# Patient Record
Sex: MALE | Race: WHITE | NOT HISPANIC OR LATINO | Employment: OTHER | URBAN - NONMETROPOLITAN AREA
[De-identification: names, ages, dates, MRNs, and addresses within clinical notes are randomized per-mention and may not be internally consistent; named-entity substitution may affect disease eponyms.]

---

## 2023-07-18 ENCOUNTER — OFFICE VISIT (OUTPATIENT)
Dept: PRIMARY CARE | Facility: CLINIC | Age: 63
End: 2023-07-18
Payer: COMMERCIAL

## 2023-07-18 VITALS
DIASTOLIC BLOOD PRESSURE: 76 MMHG | TEMPERATURE: 97.2 F | WEIGHT: 315 LBS | SYSTOLIC BLOOD PRESSURE: 122 MMHG | HEART RATE: 64 BPM | RESPIRATION RATE: 16 BRPM | BODY MASS INDEX: 50.05 KG/M2 | OXYGEN SATURATION: 93 %

## 2023-07-18 DIAGNOSIS — G25.0 BENIGN HEAD TREMOR: ICD-10-CM

## 2023-07-18 DIAGNOSIS — Z12.5 SCREENING PSA (PROSTATE SPECIFIC ANTIGEN): ICD-10-CM

## 2023-07-18 DIAGNOSIS — R56.9 SEIZURE (MULTI): ICD-10-CM

## 2023-07-18 DIAGNOSIS — Z00.00 HEALTHCARE MAINTENANCE: ICD-10-CM

## 2023-07-18 DIAGNOSIS — Z23 IMMUNIZATION DUE: Primary | ICD-10-CM

## 2023-07-18 DIAGNOSIS — G47.33 OBSTRUCTIVE SLEEP APNEA ON CPAP: ICD-10-CM

## 2023-07-18 DIAGNOSIS — Z79.899 MEDICATION MANAGEMENT: ICD-10-CM

## 2023-07-18 PROBLEM — L91.8 SKIN TAG: Status: ACTIVE | Noted: 2023-07-18

## 2023-07-18 PROBLEM — M79.671 RIGHT FOOT PAIN: Status: ACTIVE | Noted: 2023-07-18

## 2023-07-18 PROBLEM — M54.16 RIGHT LUMBAR RADICULITIS: Status: ACTIVE | Noted: 2023-07-18

## 2023-07-18 PROBLEM — G24.9 DYSTONIA: Status: ACTIVE | Noted: 2023-07-18

## 2023-07-18 PROBLEM — R73.9 ELEVATED BLOOD SUGAR: Status: ACTIVE | Noted: 2023-07-18

## 2023-07-18 PROCEDURE — 99214 OFFICE O/P EST MOD 30 MIN: CPT | Performed by: FAMILY MEDICINE

## 2023-07-18 PROCEDURE — 99396 PREV VISIT EST AGE 40-64: CPT | Performed by: FAMILY MEDICINE

## 2023-07-18 PROCEDURE — 90715 TDAP VACCINE 7 YRS/> IM: CPT | Performed by: FAMILY MEDICINE

## 2023-07-18 PROCEDURE — 90471 IMMUNIZATION ADMIN: CPT | Performed by: FAMILY MEDICINE

## 2023-07-18 RX ORDER — SOD SULF/POT CHLORIDE/MAG SULF 1.479 G
TABLET ORAL
COMMUNITY
Start: 2022-07-22 | End: 2023-07-18 | Stop reason: ALTCHOICE

## 2023-07-18 RX ORDER — PHENYTOIN SODIUM 100 MG/1
CAPSULE, EXTENDED RELEASE ORAL
COMMUNITY
End: 2023-07-19 | Stop reason: SDUPTHER

## 2023-07-18 RX ORDER — MELOXICAM 15 MG/1
1 TABLET ORAL DAILY PRN
COMMUNITY
Start: 2022-05-25 | End: 2023-07-18 | Stop reason: ALTCHOICE

## 2023-07-18 ASSESSMENT — PAIN SCALES - GENERAL: PAINLEVEL: 0-NO PAIN

## 2023-07-18 ASSESSMENT — PATIENT HEALTH QUESTIONNAIRE - PHQ9
SUM OF ALL RESPONSES TO PHQ9 QUESTIONS 1 AND 2: 0
1. LITTLE INTEREST OR PLEASURE IN DOING THINGS: NOT AT ALL
2. FEELING DOWN, DEPRESSED OR HOPELESS: NOT AT ALL

## 2023-07-18 NOTE — PROGRESS NOTES
Subjective   Patient ID: Neto Yepez is a 62 y.o. male who presents for Annual Exam.    HPI   Neto was seen today for an annual wellness exam, review of his seizure medicine (Dilantin).  He overall feels well, has no significant new concerns today, is due for fasting blood work, he would like to do in the a.m.  CPAP continues to be used nightly, 100% of the time, resulting in resolution of snoring, more restorative sleep, less daytime drowsiness, better overall wellbeing.   There are no complaints of chest pain, shortness of breath, lower extremity edema, or exertional concerns  He notes the presence of blue skin changes at times, left foot only.  He does have moderate varicosities on that side, though denies swelling.  Previous labs reviewed, all stable and reassuring.  Colonoscopy is up-to-date, as is his Shingrix series  Leslie only other minor concern is that of left thumb pain, wonders if it is a trigger finger, snaps from time to time.    Review of Systems  The full, 10+ multi-organ review of systems, is within normal limits with the exception of what is noted above in HPI.  Objective   /76 (BP Location: Right arm, Patient Position: Sitting, BP Cuff Size: Large adult)   Pulse 64   Temp 36.2 °C (97.2 °F) (Temporal)   Resp 16   Wt (!) 158 kg (348 lb 12.8 oz)   SpO2 93%   BMI 50.05 kg/m²     Physical Exam  Constitutional/General appearance: alert, oriented, well-appearing, in no distress  Head and face exam is normal  No scleral icterus or conjunctival erythema present  Hearing is grossly normal  Respiratory effort is normal, no dyspnea noted  Cortical function is normal  Mood, affect, are pleasant, appropriate, and interactive.  Insight is normal  Cardiac exam reveals a regular rate and rhythm,  murmur present.   Lungs are clear bilaterally.    No lower extremity edema present.  Bilateral dorsalis pedis pulses are normal, capillary refill is 1 second, toes and feet are warm.  Moderate varicosities  noted on the left, mild on the right.  Thumb exam reveals very mild osteophytic changes on the left, MCP, without swelling, warmth, erythema.  Finkelstein's test is negative.  Flexion, volar flexion, finger spread strength are all normal  Assessment/Plan     Today your wellness care was reviewed.  Please keep up your vision and dental care.  Focus on lifestyle efforts, including a goal of 30 minutes of exercise 5 days/week.    A healthy diet rich in fruits, vegetables, and lean proteins encouraged.  Healthy fats, such as can be found in nuts, olives, avocados are encouraged (unless allergies prohibit).  Avoid/minimize junk and fast food    Diagnostics will be checked as/if discussed  Seizure history, no seizures since his high school years, we have elected to continue his phenytoin, since he tolerates it well.  We will check a level, as well as routine annual labs.  Left thumb pain, osteoarthritic based on history and exam, thumb spica splint discussed, over-the-counter NSAIDs as needed are fine.  Continue CPAP nightly  For trace murmur, heard at the left and right upper sternal border, I have ordered an echocardiogram.  Strong suspicion is that it is of no significance, perhaps even physiologic.  His central head and neck tremor is not sufficiently bothersome to warrant treatment.    Follow-up in 1 year    **Portions of this medical record have been created using voice recognition software and may have minor errors which are inherent in voice recognition systems. It has not been fully edited for typographical or grammatical errors**

## 2023-07-18 NOTE — PROGRESS NOTES
No concerns to address    Chaperone Present: Declined.  Chaperone Name/Title: odalys wagner  Examination Chaperoned: Entire Physical Exam

## 2023-07-19 ENCOUNTER — LAB (OUTPATIENT)
Dept: LAB | Facility: LAB | Age: 63
End: 2023-07-19
Payer: COMMERCIAL

## 2023-07-19 DIAGNOSIS — Z00.00 HEALTHCARE MAINTENANCE: ICD-10-CM

## 2023-07-19 DIAGNOSIS — R56.9 SEIZURE (MULTI): ICD-10-CM

## 2023-07-19 DIAGNOSIS — Z12.5 SCREENING PSA (PROSTATE SPECIFIC ANTIGEN): ICD-10-CM

## 2023-07-19 DIAGNOSIS — G25.0 BENIGN HEAD TREMOR: ICD-10-CM

## 2023-07-19 DIAGNOSIS — Z79.899 MEDICATION MANAGEMENT: ICD-10-CM

## 2023-07-19 LAB
ALANINE AMINOTRANSFERASE (SGPT) (U/L) IN SER/PLAS: 11 U/L (ref 10–52)
ALBUMIN (G/DL) IN SER/PLAS: 4.3 G/DL (ref 3.4–5)
ALKALINE PHOSPHATASE (U/L) IN SER/PLAS: 81 U/L (ref 33–136)
ANION GAP IN SER/PLAS: 12 MMOL/L (ref 10–20)
APPEARANCE, URINE: NORMAL
ASPARTATE AMINOTRANSFERASE (SGOT) (U/L) IN SER/PLAS: 9 U/L (ref 9–39)
BASOPHILS (10*3/UL) IN BLOOD BY AUTOMATED COUNT: 0.05 X10E9/L (ref 0–0.1)
BASOPHILS/100 LEUKOCYTES IN BLOOD BY AUTOMATED COUNT: 0.9 % (ref 0–2)
BILIRUBIN TOTAL (MG/DL) IN SER/PLAS: 0.4 MG/DL (ref 0–1.2)
BILIRUBIN, URINE: NEGATIVE
BLOOD, URINE: NEGATIVE
CALCIUM (MG/DL) IN SER/PLAS: 9.1 MG/DL (ref 8.6–10.6)
CARBON DIOXIDE, TOTAL (MMOL/L) IN SER/PLAS: 26 MMOL/L (ref 21–32)
CHLORIDE (MMOL/L) IN SER/PLAS: 106 MMOL/L (ref 98–107)
CHOLESTEROL (MG/DL) IN SER/PLAS: 173 MG/DL (ref 0–199)
CHOLESTEROL IN HDL (MG/DL) IN SER/PLAS: 41.8 MG/DL
CHOLESTEROL/HDL RATIO: 4.1
COLOR, URINE: YELLOW
CREATININE (MG/DL) IN SER/PLAS: 0.75 MG/DL (ref 0.5–1.3)
EOSINOPHILS (10*3/UL) IN BLOOD BY AUTOMATED COUNT: 0.23 X10E9/L (ref 0–0.7)
EOSINOPHILS/100 LEUKOCYTES IN BLOOD BY AUTOMATED COUNT: 4.2 % (ref 0–6)
ERYTHROCYTE DISTRIBUTION WIDTH (RATIO) BY AUTOMATED COUNT: 13.9 % (ref 11.5–14.5)
ERYTHROCYTE MEAN CORPUSCULAR HEMOGLOBIN CONCENTRATION (G/DL) BY AUTOMATED: 33.5 G/DL (ref 32–36)
ERYTHROCYTE MEAN CORPUSCULAR VOLUME (FL) BY AUTOMATED COUNT: 95 FL (ref 80–100)
ERYTHROCYTES (10*6/UL) IN BLOOD BY AUTOMATED COUNT: 4.77 X10E12/L (ref 4.5–5.9)
GFR MALE: >90 ML/MIN/1.73M2
GLUCOSE (MG/DL) IN SER/PLAS: 110 MG/DL (ref 74–99)
GLUCOSE, URINE: NEGATIVE MG/DL
HEMATOCRIT (%) IN BLOOD BY AUTOMATED COUNT: 45.1 % (ref 41–52)
HEMOGLOBIN (G/DL) IN BLOOD: 15.1 G/DL (ref 13.5–17.5)
IMMATURE GRANULOCYTES/100 LEUKOCYTES IN BLOOD BY AUTOMATED COUNT: 0.2 % (ref 0–0.9)
KETONES, URINE: NEGATIVE MG/DL
LDL: 110 MG/DL (ref 0–99)
LEUKOCYTE ESTERASE, URINE: NEGATIVE
LEUKOCYTES (10*3/UL) IN BLOOD BY AUTOMATED COUNT: 5.5 X10E9/L (ref 4.4–11.3)
LYMPHOCYTES (10*3/UL) IN BLOOD BY AUTOMATED COUNT: 1.32 X10E9/L (ref 1.2–4.8)
LYMPHOCYTES/100 LEUKOCYTES IN BLOOD BY AUTOMATED COUNT: 24.1 % (ref 13–44)
MONOCYTES (10*3/UL) IN BLOOD BY AUTOMATED COUNT: 0.4 X10E9/L (ref 0.1–1)
MONOCYTES/100 LEUKOCYTES IN BLOOD BY AUTOMATED COUNT: 7.3 % (ref 2–10)
NEUTROPHILS (10*3/UL) IN BLOOD BY AUTOMATED COUNT: 3.46 X10E9/L (ref 1.2–7.7)
NEUTROPHILS/100 LEUKOCYTES IN BLOOD BY AUTOMATED COUNT: 63.3 % (ref 40–80)
NITRITE, URINE: NEGATIVE
NRBC (PER 100 WBCS) BY AUTOMATED COUNT: 0 /100 WBC (ref 0–0)
PH, URINE: 7 (ref 5–8)
PHENYTOIN (UG/ML) IN SER/PLAS: 14.3 UG/ML (ref 10–20)
PLATELETS (10*3/UL) IN BLOOD AUTOMATED COUNT: 206 X10E9/L (ref 150–450)
POTASSIUM (MMOL/L) IN SER/PLAS: 4.1 MMOL/L (ref 3.5–5.3)
PROSTATE SPECIFIC ANTIGEN,SCREEN: 0.48 NG/ML (ref 0–4)
PROTEIN TOTAL: 7 G/DL (ref 6.4–8.2)
PROTEIN, URINE: NEGATIVE MG/DL
SODIUM (MMOL/L) IN SER/PLAS: 140 MMOL/L (ref 136–145)
SPECIFIC GRAVITY, URINE: 1.01 (ref 1–1.03)
THYROTROPIN (MIU/L) IN SER/PLAS BY DETECTION LIMIT <= 0.05 MIU/L: 1.61 MIU/L (ref 0.44–3.98)
TRIGLYCERIDE (MG/DL) IN SER/PLAS: 107 MG/DL (ref 0–149)
UREA NITROGEN (MG/DL) IN SER/PLAS: 16 MG/DL (ref 6–23)
UROBILINOGEN, URINE: <2 MG/DL (ref 0–1.9)
VLDL: 21 MG/DL (ref 0–40)

## 2023-07-19 PROCEDURE — 85025 COMPLETE CBC W/AUTO DIFF WBC: CPT

## 2023-07-19 PROCEDURE — 84153 ASSAY OF PSA TOTAL: CPT

## 2023-07-19 PROCEDURE — 80185 ASSAY OF PHENYTOIN TOTAL: CPT

## 2023-07-19 PROCEDURE — 84443 ASSAY THYROID STIM HORMONE: CPT

## 2023-07-19 PROCEDURE — 80053 COMPREHEN METABOLIC PANEL: CPT

## 2023-07-19 PROCEDURE — 81003 URINALYSIS AUTO W/O SCOPE: CPT

## 2023-07-19 PROCEDURE — 80061 LIPID PANEL: CPT

## 2023-07-19 PROCEDURE — 36415 COLL VENOUS BLD VENIPUNCTURE: CPT

## 2023-07-19 RX ORDER — PHENYTOIN SODIUM 100 MG/1
CAPSULE, EXTENDED RELEASE ORAL
Qty: 140 CAPSULE | Refills: 3 | Status: SHIPPED | OUTPATIENT
Start: 2023-07-19 | End: 2024-03-06 | Stop reason: SDUPTHER

## 2023-07-25 NOTE — RESULT ENCOUNTER NOTE
Sugar is 110, a little above normal, cholesterol panel very good (LDL of 110 is fine).  All other labs are excellent... prostate, blood counts, urine, dilantin level.  Recheck in 1 yr

## 2023-07-28 ENCOUNTER — TELEPHONE (OUTPATIENT)
Dept: PRIMARY CARE | Facility: CLINIC | Age: 63
End: 2023-07-28
Payer: COMMERCIAL

## 2023-07-28 NOTE — PROGRESS NOTES
Called patient to check in on his mother post hospitalization in June, he had a question regarding Dr Duff's last appt with him and in regards to echocardiogram scheduling. I looked and did not see an order for this. Message sent to Dr Duff to enter referral.

## 2023-07-31 DIAGNOSIS — R01.1 HEART MURMUR: Primary | ICD-10-CM

## 2024-03-06 DIAGNOSIS — R56.9 SEIZURE (MULTI): ICD-10-CM

## 2024-03-06 RX ORDER — PHENYTOIN SODIUM 100 MG/1
CAPSULE, EXTENDED RELEASE ORAL
Qty: 140 CAPSULE | Refills: 11 | Status: SHIPPED | OUTPATIENT
Start: 2024-03-06 | End: 2024-04-03 | Stop reason: SDUPTHER

## 2024-03-06 NOTE — TELEPHONE ENCOUNTER
FYI: Dilantin 100 mg capsules is not covered by Pt insurance. Recommended alternatives are 200 mg and 300 mg capsules.

## 2024-04-03 DIAGNOSIS — R56.9 SEIZURE (MULTI): ICD-10-CM

## 2024-04-03 RX ORDER — PHENYTOIN SODIUM 100 MG/1
CAPSULE, EXTENDED RELEASE ORAL
Qty: 140 CAPSULE | Refills: 11 | Status: SHIPPED | OUTPATIENT
Start: 2024-04-03

## 2024-07-18 ENCOUNTER — APPOINTMENT (OUTPATIENT)
Dept: PRIMARY CARE | Facility: CLINIC | Age: 64
End: 2024-07-18
Payer: COMMERCIAL

## 2024-07-18 ASSESSMENT — PROMIS GLOBAL HEALTH SCALE
RATE_QUALITY_OF_LIFE: EXCELLENT
CARRYOUT_PHYSICAL_ACTIVITIES: COMPLETELY
RATE_AVERAGE_PAIN: 0
RATE_GENERAL_HEALTH: EXCELLENT
RATE_PHYSICAL_HEALTH: EXCELLENT
EMOTIONAL_PROBLEMS: NEVER
CARRYOUT_SOCIAL_ACTIVITIES: EXCELLENT
RATE_SOCIAL_SATISFACTION: EXCELLENT
RATE_MENTAL_HEALTH: EXCELLENT

## 2024-07-24 ENCOUNTER — APPOINTMENT (OUTPATIENT)
Dept: PRIMARY CARE | Facility: CLINIC | Age: 64
End: 2024-07-24
Payer: COMMERCIAL

## 2024-07-24 VITALS
OXYGEN SATURATION: 93 % | DIASTOLIC BLOOD PRESSURE: 82 MMHG | HEIGHT: 71 IN | HEART RATE: 57 BPM | WEIGHT: 315 LBS | TEMPERATURE: 96.8 F | SYSTOLIC BLOOD PRESSURE: 127 MMHG | BODY MASS INDEX: 44.1 KG/M2

## 2024-07-24 DIAGNOSIS — Z00.00 HEALTHCARE MAINTENANCE: ICD-10-CM

## 2024-07-24 DIAGNOSIS — Z79.899 MEDICATION MANAGEMENT: ICD-10-CM

## 2024-07-24 DIAGNOSIS — R56.9 SEIZURE (MULTI): Primary | ICD-10-CM

## 2024-07-24 DIAGNOSIS — G47.33 OBSTRUCTIVE SLEEP APNEA ON CPAP: ICD-10-CM

## 2024-07-24 DIAGNOSIS — M54.12 LEFT CERVICAL RADICULOPATHY: ICD-10-CM

## 2024-07-24 DIAGNOSIS — Z12.5 SCREENING PSA (PROSTATE SPECIFIC ANTIGEN): ICD-10-CM

## 2024-07-24 PROCEDURE — 99396 PREV VISIT EST AGE 40-64: CPT | Performed by: FAMILY MEDICINE

## 2024-07-24 PROCEDURE — 3008F BODY MASS INDEX DOCD: CPT | Performed by: FAMILY MEDICINE

## 2024-07-24 NOTE — PROGRESS NOTES
"Subjective   Patient ID: Neto Yepez is a 63 y.o. male who presents for Annual Exam (Neto is here for his Annual exam. Pt would like to discuss some issues with his neck and some tingling in his arm when turning his head. ).    HPI   Neto was seen today for a routine wellness review, as well as a review of his Dilantin, which he has taken for many years for seizure disorder.  He overall feels well, is due for fasting labs.  His only minor concern today is that of intermittent left-sided neck pain, crepitus, with left upper extremity paresthesias.  Symptoms are not consistent, but frequent enough to mention.  He denies any recent injury or trauma, nor any upper extremity weakness.  He has had imaging studies in the past that showed degenerative disc disease.  CPAP continues to be used nightly, 100% of the time, resulting in resolution of snoring, more restorative sleep, less daytime drowsiness, better overall wellbeing.  Vaccines are up-to-date, including flu, Tdap, Shingrix series.  Colonoscopy is up-to-date    Review of Systems  The full, 10+ multi-organ review of systems, is within normal limits with the exception of what is noted above in HPI.  Objective   /82 (BP Location: Right arm, Patient Position: Sitting, BP Cuff Size: Large adult)   Pulse 57   Temp 36 °C (96.8 °F) (Temporal)   Ht 1.803 m (5' 11\")   Wt (!) 164 kg (361 lb 9.6 oz)   SpO2 93%   BMI 50.43 kg/m²     Physical Exam  Constitutional/General appearance: alert, oriented, well-appearing, in no distress  Head and face exam is normal  No scleral icterus or conjunctival erythema present  Hearing is grossly normal  Respiratory effort is normal, no dyspnea noted  Cortical function is normal  Mood, affect, are pleasant, appropriate, and interactive.  Insight is normal  Cardiac exam reveals a regular rate and rhythm,  trace murmur present.   Lungs are clear bilaterally.    No lower extremity edema present.    Assessment/Plan     Today your " wellness care was reviewed.  Please keep up your vision and dental care.  Focus on lifestyle efforts, including a goal of 30 minutes of exercise 5 days/week.    A healthy diet rich in fruits, vegetables, and lean proteins encouraged.  Healthy fats, such as can be found in nuts, olives, avocados are encouraged (unless allergies prohibit).  Avoid/minimize junk and fast food    Diagnostics will be checked as/if discussed  Continuing Dilantin, needs brand-name    Follow-up in 1 year    **Portions of this medical record have been created using voice recognition software and may have minor errors which are inherent in voice recognition systems. It has not been fully edited for typographical or grammatical errors**

## 2024-07-25 ENCOUNTER — LAB (OUTPATIENT)
Dept: LAB | Facility: LAB | Age: 64
End: 2024-07-25
Payer: COMMERCIAL

## 2024-07-25 DIAGNOSIS — Z00.00 HEALTHCARE MAINTENANCE: ICD-10-CM

## 2024-07-25 DIAGNOSIS — G47.33 OBSTRUCTIVE SLEEP APNEA ON CPAP: ICD-10-CM

## 2024-07-25 DIAGNOSIS — R56.9 SEIZURE (MULTI): ICD-10-CM

## 2024-07-25 DIAGNOSIS — Z12.5 SCREENING PSA (PROSTATE SPECIFIC ANTIGEN): ICD-10-CM

## 2024-07-25 PROCEDURE — 36415 COLL VENOUS BLD VENIPUNCTURE: CPT

## 2024-07-25 PROCEDURE — 84153 ASSAY OF PSA TOTAL: CPT

## 2024-07-25 PROCEDURE — 80185 ASSAY OF PHENYTOIN TOTAL: CPT

## 2024-07-25 PROCEDURE — 84443 ASSAY THYROID STIM HORMONE: CPT

## 2024-07-25 PROCEDURE — 81001 URINALYSIS AUTO W/SCOPE: CPT

## 2024-07-25 PROCEDURE — 83036 HEMOGLOBIN GLYCOSYLATED A1C: CPT

## 2024-07-25 PROCEDURE — 80053 COMPREHEN METABOLIC PANEL: CPT

## 2024-07-25 PROCEDURE — 80061 LIPID PANEL: CPT

## 2024-07-25 PROCEDURE — 85025 COMPLETE CBC W/AUTO DIFF WBC: CPT

## 2024-07-26 LAB
ALBUMIN SERPL BCP-MCNC: 4.4 G/DL (ref 3.4–5)
ALP SERPL-CCNC: 93 U/L (ref 33–136)
ALT SERPL W P-5'-P-CCNC: 13 U/L (ref 10–52)
ANION GAP SERPL CALC-SCNC: 15 MMOL/L (ref 10–20)
APPEARANCE UR: ABNORMAL
AST SERPL W P-5'-P-CCNC: 11 U/L (ref 9–39)
BASOPHILS # BLD AUTO: 0.06 X10*3/UL (ref 0–0.1)
BASOPHILS NFR BLD AUTO: 1.1 %
BILIRUB SERPL-MCNC: 0.4 MG/DL (ref 0–1.2)
BILIRUB UR STRIP.AUTO-MCNC: NEGATIVE MG/DL
BUN SERPL-MCNC: 14 MG/DL (ref 6–23)
CALCIUM SERPL-MCNC: 9.1 MG/DL (ref 8.6–10.6)
CHLORIDE SERPL-SCNC: 105 MMOL/L (ref 98–107)
CHOLEST SERPL-MCNC: 167 MG/DL (ref 0–199)
CHOLESTEROL/HDL RATIO: 3.6
CO2 SERPL-SCNC: 26 MMOL/L (ref 21–32)
COLOR UR: ABNORMAL
CREAT SERPL-MCNC: 0.75 MG/DL (ref 0.5–1.3)
EGFRCR SERPLBLD CKD-EPI 2021: >90 ML/MIN/1.73M*2
EOSINOPHIL # BLD AUTO: 0.23 X10*3/UL (ref 0–0.7)
EOSINOPHIL NFR BLD AUTO: 4 %
ERYTHROCYTE [DISTWIDTH] IN BLOOD BY AUTOMATED COUNT: 13.6 % (ref 11.5–14.5)
EST. AVERAGE GLUCOSE BLD GHB EST-MCNC: 117 MG/DL
GLUCOSE SERPL-MCNC: 101 MG/DL (ref 74–99)
GLUCOSE UR STRIP.AUTO-MCNC: NORMAL MG/DL
HBA1C MFR BLD: 5.7 %
HCT VFR BLD AUTO: 43.1 % (ref 41–52)
HDLC SERPL-MCNC: 45.9 MG/DL
HGB BLD-MCNC: 14.5 G/DL (ref 13.5–17.5)
IMM GRANULOCYTES # BLD AUTO: 0.02 X10*3/UL (ref 0–0.7)
IMM GRANULOCYTES NFR BLD AUTO: 0.4 % (ref 0–0.9)
KETONES UR STRIP.AUTO-MCNC: NEGATIVE MG/DL
LDLC SERPL CALC-MCNC: 98 MG/DL
LEUKOCYTE ESTERASE UR QL STRIP.AUTO: NEGATIVE
LYMPHOCYTES # BLD AUTO: 1.5 X10*3/UL (ref 1.2–4.8)
LYMPHOCYTES NFR BLD AUTO: 26.3 %
MCH RBC QN AUTO: 31.9 PG (ref 26–34)
MCHC RBC AUTO-ENTMCNC: 33.6 G/DL (ref 32–36)
MCV RBC AUTO: 95 FL (ref 80–100)
MONOCYTES # BLD AUTO: 0.4 X10*3/UL (ref 0.1–1)
MONOCYTES NFR BLD AUTO: 7 %
NEUTROPHILS # BLD AUTO: 3.49 X10*3/UL (ref 1.2–7.7)
NEUTROPHILS NFR BLD AUTO: 61.2 %
NITRITE UR QL STRIP.AUTO: NEGATIVE
NON HDL CHOLESTEROL: 121 MG/DL (ref 0–149)
NRBC BLD-RTO: 0 /100 WBCS (ref 0–0)
PH UR STRIP.AUTO: 6 [PH]
PHENYTOIN SERPL-MCNC: 10.1 UG/ML (ref 10–20)
PLATELET # BLD AUTO: 190 X10*3/UL (ref 150–450)
POTASSIUM SERPL-SCNC: 4 MMOL/L (ref 3.5–5.3)
PROT SERPL-MCNC: 7.1 G/DL (ref 6.4–8.2)
PROT UR STRIP.AUTO-MCNC: ABNORMAL MG/DL
PSA SERPL-MCNC: 0.33 NG/ML
RBC # BLD AUTO: 4.54 X10*6/UL (ref 4.5–5.9)
RBC # UR STRIP.AUTO: NEGATIVE /UL
RBC #/AREA URNS AUTO: NORMAL /HPF
SODIUM SERPL-SCNC: 142 MMOL/L (ref 136–145)
SP GR UR STRIP.AUTO: 1.04
TRIGL SERPL-MCNC: 117 MG/DL (ref 0–149)
TSH SERPL-ACNC: 1.61 MIU/L (ref 0.44–3.98)
UROBILINOGEN UR STRIP.AUTO-MCNC: ABNORMAL MG/DL
VLDL: 23 MG/DL (ref 0–40)
WBC # BLD AUTO: 5.7 X10*3/UL (ref 4.4–11.3)
WBC #/AREA URNS AUTO: NORMAL /HPF

## 2024-07-28 DIAGNOSIS — R82.90 ABNORMAL URINALYSIS: Primary | ICD-10-CM

## 2024-10-22 DIAGNOSIS — R56.9 SEIZURE (MULTI): ICD-10-CM

## 2024-10-22 RX ORDER — PHENYTOIN SODIUM 100 MG/1
CAPSULE, EXTENDED RELEASE ORAL
Qty: 140 CAPSULE | Refills: 5 | Status: SHIPPED | OUTPATIENT
Start: 2024-10-22

## 2025-01-23 ENCOUNTER — TELEPHONE (OUTPATIENT)
Dept: PRIMARY CARE | Facility: CLINIC | Age: 65
End: 2025-01-23
Payer: COMMERCIAL

## 2025-01-23 DIAGNOSIS — R56.9 SEIZURE (MULTI): ICD-10-CM

## 2025-01-23 RX ORDER — PHENYTOIN SODIUM 100 MG/1
CAPSULE, EXTENDED RELEASE ORAL
Qty: 140 CAPSULE | Refills: 5 | Status: SHIPPED | OUTPATIENT
Start: 2025-01-23 | End: 2025-01-24 | Stop reason: SDUPTHER

## 2025-01-23 NOTE — TELEPHONE ENCOUNTER
Pt called stating the pharmacy will be transferring the prescription over to a [facility] who has it in stock. States there is nothing for you to do they will handle it

## 2025-01-23 NOTE — TELEPHONE ENCOUNTER
Patient called back and I advised.  Patient will look for a pharm and call us back when he has one.

## 2025-01-23 NOTE — TELEPHONE ENCOUNTER
[facility] pharmacy called in stating they don't have the Dilantin in stock and the manufacture has been switched to  [facility] specialty and they aren't sure when they may get this stock in to fill prescription.

## 2025-01-23 NOTE — TELEPHONE ENCOUNTER
See message....  Please see if Neto can check with a few pharmacies to see who may have his brand name dilantin...

## 2025-01-24 ENCOUNTER — TELEPHONE (OUTPATIENT)
Dept: PRIMARY CARE | Facility: CLINIC | Age: 65
End: 2025-01-24

## 2025-01-24 RX ORDER — PHENYTOIN SODIUM 100 MG/1
CAPSULE, EXTENDED RELEASE ORAL
Qty: 140 CAPSULE | Refills: 11 | Status: SHIPPED | OUTPATIENT
Start: 2025-01-24

## 2025-01-24 NOTE — TELEPHONE ENCOUNTER
Pt called back wanting to verify if the [facility] was [facility] to [facility] with clear instructions

## 2025-02-07 DIAGNOSIS — Z00.00 HEALTH MAINTENANCE EXAMINATION: ICD-10-CM

## 2025-06-06 ASSESSMENT — ENCOUNTER SYMPTOMS: LOSS OF MOTION: 1

## 2025-06-09 ENCOUNTER — APPOINTMENT (OUTPATIENT)
Facility: CLINIC | Age: 65
End: 2025-06-09
Payer: COMMERCIAL

## 2025-06-09 ENCOUNTER — HOSPITAL ENCOUNTER (OUTPATIENT)
Dept: RADIOLOGY | Facility: CLINIC | Age: 65
Discharge: HOME | End: 2025-06-09
Payer: COMMERCIAL

## 2025-06-09 VITALS
DIASTOLIC BLOOD PRESSURE: 84 MMHG | OXYGEN SATURATION: 93 % | BODY MASS INDEX: 50.63 KG/M2 | TEMPERATURE: 96 F | HEART RATE: 63 BPM | WEIGHT: 315 LBS | SYSTOLIC BLOOD PRESSURE: 138 MMHG

## 2025-06-09 DIAGNOSIS — M25.561 ACUTE PAIN OF RIGHT KNEE: Primary | ICD-10-CM

## 2025-06-09 DIAGNOSIS — M25.561 ACUTE PAIN OF RIGHT KNEE: ICD-10-CM

## 2025-06-09 DIAGNOSIS — G40.909 NONINTRACTABLE EPILEPSY WITHOUT STATUS EPILEPTICUS, UNSPECIFIED EPILEPSY TYPE (MULTI): ICD-10-CM

## 2025-06-09 DIAGNOSIS — R56.9 SEIZURE (MULTI): ICD-10-CM

## 2025-06-09 PROCEDURE — 73564 X-RAY EXAM KNEE 4 OR MORE: CPT | Mod: RT

## 2025-06-09 PROCEDURE — 1036F TOBACCO NON-USER: CPT | Performed by: FAMILY MEDICINE

## 2025-06-09 PROCEDURE — 99214 OFFICE O/P EST MOD 30 MIN: CPT | Performed by: FAMILY MEDICINE

## 2025-06-09 PROCEDURE — 73564 X-RAY EXAM KNEE 4 OR MORE: CPT | Mod: RIGHT SIDE | Performed by: RADIOLOGY

## 2025-06-09 RX ORDER — MELOXICAM 15 MG/1
TABLET ORAL
Qty: 15 TABLET | Refills: 1 | Status: SHIPPED | OUTPATIENT
Start: 2025-06-09

## 2025-06-09 NOTE — PROGRESS NOTES
[facility]    [address]  [place] [address]    Phone: [phone]  Fax: [phone]    Progress Note        Subjective   Patient ID: Neto Yepez is a 64 y.o. male who presents for Right Knee Pain  (Thrown from a tractor a month ago but pain started a few days ago. /Hurts to bend it ).  HPI  Neto was seen today with concerns of right knee pain, present for roughly 1 month, worse recently.  Pain began when he was thrown off of his lawn more and landed on his right side, including his shoulder and lateral lower leg.  He sustained an abrasion on the latter.  Since then, and more recently, he has noted swelling, difficulty flexing his knee, and states that it feels unstable at times.  Neto is been taking Advil up to 6 tablets daily, has used ice regularly.  He feels stiffness and some pain when getting up from a seated position, walks mild limp.  He has no prior history of knee pain.    Medication(s) are being taken and tolerated as prescribed, without concerns, list reconciled today.    Review of Systems  The full review of systems is negative with the exception of what is noted in HPI    No Known Allergies    Current Outpatient Medications:     Dilantin Extended 100 mg capsule, IN THE AM ALTERNATE 2 OR 3 TABLETS, AND TAKE 2 TABLETS EVERY EVENING AS DIRECTED..... JS, Disp: 140 capsule, Rfl: 11  Past Medical History:   Diagnosis Date    Personal history of other medical treatment     History of bone density study    Unspecified convulsions (Multi) 2019    Seizure     Social History     Tobacco Use    Smoking status: Former     Current packs/day: 0.00     Average packs/day: 0.3 packs/day for 37.0 years (9.3 ttl pk-yrs)     Types: Cigarettes     Start date:      Quit date: 2017     Years since quittin.4     Passive exposure: Past    Smokeless tobacco: Never   Substance Use Topics    Alcohol use: Not Currently    Drug use: Never         Objective    /84 (BP Location: Left arm, Patient Position: Sitting, BP Cuff Size: Large adult)   Pulse 63   Temp 35.6 °C (96 °F)   Wt (!) 165 kg (363 lb)   SpO2 93%   BMI 50.63 kg/m²   Physical Exam  Constitutional/General appearance: alert, oriented, well-appearing, in no distress  Head and face exam is normal  No scleral icterus or conjunctival erythema present  Hearing is grossly normal  Respiratory effort is normal, no dyspnea noted  Cortical function is normal  Mood, affect, are pleasant, appropriate, and interactive.  Insight is normal  Right knee exam reveals a mild effusion, trace warmth compared to the left.  Hamstring flexibility is reduced.  I am unable to sufficiently flex the knee to assess Matilde's sign.  Anterior and posterior drawer signs are negative.  He has no patellar crepitus or apprehension.    Assessment/Plan   Right knee pain, history and exam as noted.  I recommend checking knee x-ray, given the duration of his pain, lack of improvement.  I have also sent a prescription for meloxicam 15 mg daily with food, to replace his over-the-counter Advil regimen.  Ice should be continued, 2-3 times a day should be sufficient.  Physical therapy referral has been ordered.  If insufficient improvement noticed with a trial of PT I can be considered.    Seizure history, continue Dilantin per current regimen, check level with upcoming labs.    Keep upcoming appointment, check labs just prior.      Total time spent with patient, reviewing records, and completing charting was 32 minutes, over half of it spent counseling and/or coordinating care  **Portions of this medical record have been created using voice recognition software and may have minor errors which are inherent in voice recognition systems. It has not been fully edited for typographical or grammatical errors**

## 2025-06-25 ENCOUNTER — OFFICE VISIT (OUTPATIENT)
Dept: ORTHOPEDIC SURGERY | Age: 65
End: 2025-06-25
Payer: COMMERCIAL

## 2025-06-25 VITALS — HEIGHT: 71 IN | WEIGHT: 315 LBS | BODY MASS INDEX: 44.1 KG/M2

## 2025-06-25 DIAGNOSIS — M17.11 PRIMARY OSTEOARTHRITIS OF RIGHT KNEE: Primary | ICD-10-CM

## 2025-06-25 DIAGNOSIS — M25.461 KNEE EFFUSION, RIGHT: ICD-10-CM

## 2025-06-25 DIAGNOSIS — M25.561 ACUTE PAIN OF RIGHT KNEE: ICD-10-CM

## 2025-06-25 DIAGNOSIS — R26.9 ABNORMALITY OF GAIT AND MOBILITY: ICD-10-CM

## 2025-06-25 PROCEDURE — 1036F TOBACCO NON-USER: CPT

## 2025-06-25 PROCEDURE — 20610 DRAIN/INJ JOINT/BURSA W/O US: CPT

## 2025-06-25 PROCEDURE — 99203 OFFICE O/P NEW LOW 30 MIN: CPT

## 2025-06-25 PROCEDURE — 3008F BODY MASS INDEX DOCD: CPT

## 2025-06-25 RX ORDER — LIDOCAINE HYDROCHLORIDE 10 MG/ML
2 INJECTION, SOLUTION INFILTRATION; PERINEURAL
Status: COMPLETED | OUTPATIENT
Start: 2025-06-25 | End: 2025-06-25

## 2025-06-25 RX ORDER — TRIAMCINOLONE ACETONIDE 40 MG/ML
40 INJECTION, SUSPENSION INTRA-ARTICULAR; INTRAMUSCULAR
Status: COMPLETED | OUTPATIENT
Start: 2025-06-25 | End: 2025-06-25

## 2025-06-25 RX ADMIN — LIDOCAINE HYDROCHLORIDE 2 ML: 10 INJECTION, SOLUTION INFILTRATION; PERINEURAL at 11:04

## 2025-06-25 RX ADMIN — TRIAMCINOLONE ACETONIDE 40 MG: 40 INJECTION, SUSPENSION INTRA-ARTICULAR; INTRAMUSCULAR at 11:04

## 2025-06-25 ASSESSMENT — PAIN SCALES - GENERAL: PAINLEVEL_OUTOF10: 7

## 2025-06-25 ASSESSMENT — PAIN - FUNCTIONAL ASSESSMENT: PAIN_FUNCTIONAL_ASSESSMENT: 0-10

## 2025-06-25 ASSESSMENT — PAIN DESCRIPTION - DESCRIPTORS: DESCRIPTORS: DULL;SHARP

## 2025-06-25 NOTE — PROGRESS NOTES
PRIMARY CARE PHYSICIAN: No Assigned PCP Generic Provider, MD  REFERRING PROVIDER: Elvin Duff MD  [address]  [facility] [address]  [place] [address]     CONSULT ORTHOPAEDIC: Knee Evaluation    ASSESSMENT & PLAN    Impression: 64 y.o. male with right knee osteoarthritis     Plan:   I explained to the patient the nature of their diagnosis.  I reviewed their imaging studies with them.    Based on the history, physical exam and imaging studies above, the patient's presentation is consistent with the above diagnosis.  I had a long discussion with the patient regarding their presentation and the treatment options.  We discussed initial nonoperative versus operative management options as well as potential further diagnostic imaging. I reviewed the x-ray image findings with the patient which show predominantly medial compartment osteoarthritis. We discussed initial conservative treatment options for this. He will continue with Meloxicam as needed once he completes the oral steroid provided by his PCP. He will begin working with physical therapy on quadricept activation, hamstring flexibility and knee range of motion. We discussed the role of a corticosteroid injection which patient expressed his wishes to proceed with. He was provided with an injection into the right knee today as described below and tolerated well. He will practice good weight management and focus on low impact activities. He will ice, elevate and rest the knee as he needs. All questions were answered and patient was in agreement with the above plan.    Follow-Up: Patient will follow-up as needed for persistent pain    At the end of the visit, all questions were answered in full. The patient is in agreement with the plan and recommendations. They will call the office with any questions/concerns.    Note dictated with [employer] software. Completed without full typed error editing and sent to avoid delay.       SUBJECTIVE  CHIEF COMPLAINT:    Chief Complaint   Patient presents with    Right Knee - Pain        HPI: Neto Yepez is a pleasant 64 y.o. male here today for right knee pain, XR performed 6/9/25. Neto reports he injured the right knee after a fall off of his lawnmower on 05/15/2025. He landed on his right side including his shoulder and lateral right leg, sustaining an abrasion on his leg that has since healed. He did not have knee pain initially but noticed increasing discomfort and swelling a few weeks later. Neto reports a dull ache at rest and sharp with flexion. Has had a difficulty getting in and out of his vehicle. Pain is localized to the medial joint line. No history of prior right knee injuries or pain. He saw his PCP for this on 6/9/25 who provided him a prescription of Meloxicam and referral to physical therapy. Is scheduled for his first PT session on July 3rd. Reports mild improvement with Meloxicam. He recently contacted his PCP again for persistent pain who provided him with an oral steroid and referral to orthopedics.     They deny any constant or progressive numbness or tingling in their legs.     REVIEW OF SYSTEMS  Constitutional: See HPI for pain assessment, No significant recent weight gain or loss, no fever or chills  Cardiovascular: No chest pain, shortness of breath  Respiratory: No difficulty breathing, no cough  Gastrointestinal: No nausea, vomiting, diarrhea, constipation  Musculoskeletal: Noted in HPI, positive for pain, restricted motion, stiffness  Integumentary: No rashes, easy bruising or skin lesions  Neurological: No headache, no numbness or tingling in extremities  Psychiatric: No mood changes or memory changes. No social issues  Heme/Lymph: No excessive swelling, easy bruising or excessive bleeding  ENT: No hearing changes, no nosebleeds  Eyes: No vision changes    Medical History[1]     Allergies[2]     Surgical History[3]     Family History[4]     Social History     Socioeconomic History    Marital  "status: Single     Spouse name: Not on file    Number of children: Not on file    Years of education: Not on file    Highest education level: Not on file   Occupational History    Not on file   Tobacco Use    Smoking status: Former     Current packs/day: 0.00     Average packs/day: 0.3 packs/day for 37.0 years (9.3 ttl pk-yrs)     Types: Cigarettes     Start date:      Quit date:      Years since quittin.4     Passive exposure: Past    Smokeless tobacco: Never   Substance and Sexual Activity    Alcohol use: Not Currently    Drug use: Never    Sexual activity: Not on file   Other Topics Concern    Not on file   Social History Narrative    Not on file     Social Drivers of Health     Financial Resource Strain: Not on file   Food Insecurity: Not on file   Transportation Needs: Not on file   Physical Activity: Not on file   Stress: Not on file   Social Connections: Not on file   Intimate Partner Violence: Not on file   Housing Stability: Not on file        CURRENT MEDICATIONS:   Current Medications[5]     OBJECTIVE    PHYSICAL EXAM      2021     8:05 AM 2021     2:02 PM 2022    10:01 AM 7/15/2022     3:56 PM 2023     2:59 PM 2024     2:56 PM 2025     1:01 PM   Vitals   Systolic 122 137 136 124 122 127 138   Diastolic 72 75 92 70 76 82 84   BP Location     Right arm Right arm Left arm   Heart Rate 54 63 64 58 64 57 63   Temp 36 °C (96.8 °F) 32.8 °C (91 °F) 35.6 °C (96 °F) 36.7 °C (98.1 °F) 36.2 °C (97.2 °F) 36 °C (96.8 °F) 35.6 °C (96 °F)   Resp 14 14 20 14 16     Height 1.803 m (5' 11\") 1.778 m (5' 10\")  1.778 m (5' 10\")  1.803 m (5' 11\")    Weight (lb) 365 353 246.4 349.6 348.8 361.6 363   BMI 50.91 kg/m2 50.65 kg/m2 35.35 kg/m2 50.16 kg/m2 50.05 kg/m2 50.43 kg/m2 50.63 kg/m2   BSA (m2) 2.88 m2 2.81 m2 2.35 m2 2.8 m2 2.79 m2 2.87 m2 2.87 m2   Visit Report     Report Report Report      There is no height or weight on file to calculate BMI.    GENERAL: A/Ox3, NAD. Appears healthy, " well nourished  PSYCHIATRIC: Mood stable, appropriate memory recall  EYES: EOM intact, no scleral icterus  CARDIOVASCULAR: Palpable peripheral pulses  LUNGS: Breathing non-labored on room air  SKIN: No open lesions, rashes, ulcerations    MUSCULOSKELETAL:  Laterality: right Knee Exam  - Skin intact  - No erythema or warmth  - No ecchymosis or soft tissue swelling  - Palpation: positive TTP medial joint line  - ROM: 0-0-120  - Effusion: small  - Strength: knee extension and flexion 5/5, EHL/PF/DF motor intact  - Stability:        Anterior drawer stable       Posterior drawer stable       Varus/valgus stable       negative Lachman  - Equivocal Matilde's  - Gait: trace antalgic  - Hip Exam: flexion to 100+ degrees, full extension, internal/external rotation adequate, and no pain with log roll    NEUROVASCULAR:  - Neurovascular Status: sensation intact to light touch distally, lower extremity motor intact  - Capillary refill brisk at extremities, Bilateral dorsalis pedis pulse 2+    Imaging: Multiple views of the affected right knee(s) demonstrate: no acute osseous abnormality, no fracture. Degenerative changes most predominant in medial compartment.  X-rays were personally reviewed and interpreted by me.  Radiology reports were reviewed by me as well, if readily available at the time.     L Inj/Asp: R knee on 6/25/2025 11:04 AM  Indications: pain  Details: 25 G needle, anterolateral approach  Medications: 40 mg triamcinolone acetonide 40 mg/mL; 2 mL lidocaine 10 mg/mL (1 %)  Outcome: tolerated well, no immediate complications  Procedure, treatment alternatives, risks and benefits explained, specific risks discussed. Consent was given by the patient. Immediately prior to procedure a time out was called to verify the correct patient, procedure, equipment, support staff and site/side marked as required. Patient was prepped and draped in the usual sterile fashion.                  [1]   Past Medical History:  Diagnosis  Date    Personal history of other medical treatment     History of bone density study    Unspecified convulsions (Multi) 03/21/2019    Seizure   [2] No Known Allergies  [3]   Past Surgical History:  Procedure Laterality Date    COLONOSCOPY  02/17/2014    Complete Colonoscopy    MANDIBLE SURGERY  02/17/2014    Jaw Surgery   [4]   Family History  Problem Relation Name Age of Onset    Parkinsonism Father     [5]   Current Outpatient Medications   Medication Sig Dispense Refill    Dilantin Extended 100 mg capsule IN THE AM ALTERNATE 2 OR 3 TABLETS, AND TAKE 2 TABLETS EVERY EVENING AS DIRECTED.....  capsule 11    meloxicam (Mobic) 15 mg tablet Take 1 tablet daily with food for right knee pain. 15 tablet 1    predniSONE (Deltasone) 10 mg tablet Take 4 tabs daily for 2 days, then 3 tabs daily for 2 days, then 2 tabs daily for 2 days, then 1 tab daily for 2 days, then stop.  Take with food. 20 tablet 0     No current facility-administered medications for this visit.

## 2025-07-02 ASSESSMENT — ENCOUNTER SYMPTOMS
BOWEL INCONTINENCE: 1
BACK PAIN: 1

## 2025-07-02 NOTE — PROGRESS NOTES
[facility]    [address]  [place] [address]    Phone: [phone]  Fax: [phone]    Progress Note        Subjective   Patient ID: Neto Yepez is a 64 y.o. male who presents for No chief complaint on file..  HPI    Review of Systems    RX Allergies[1]  Current Medications[2]  Medical History[3]  Social History[4]      Objective   There were no vitals taken for this visit.  Physical Exam    Assessment/Plan         Total time spent with patient, reviewing records, and completing charting was 30 minutes, over half of it spent counseling and/or coordinating care  **Portions of this medical record have been created using voice recognition software and may have minor errors which are inherent in voice recognition systems. It has not been fully edited for typographical or grammatical errors**           [1] No Known Allergies  [2]   Current Outpatient Medications:     Dilantin Extended 100 mg capsule, IN THE AM ALTERNATE 2 OR 3 TABLETS, AND TAKE 2 TABLETS EVERY EVENING AS DIRECTED..... JS, Disp: 140 capsule, Rfl: 11    meloxicam (Mobic) 15 mg tablet, Take 1 tablet daily with food for right knee pain., Disp: 15 tablet, Rfl: 1    predniSONE (Deltasone) 10 mg tablet, Take 4 tabs daily for 2 days, then 3 tabs daily for 2 days, then 2 tabs daily for 2 days, then 1 tab daily for 2 days, then stop.  Take with food., Disp: 20 tablet, Rfl: 0  [3]   Past Medical History:  Diagnosis Date    Personal history of other medical treatment     History of bone density study    Unspecified convulsions (Multi) 2019    Seizure   [4]   Social History  Tobacco Use    Smoking status: Former     Current packs/day: 0.00     Average packs/day: 0.3 packs/day for 37.0 years (9.3 ttl pk-yrs)     Types: Cigarettes     Start date:      Quit date: 2017     Years since quittin.5     Passive exposure: Past    Smokeless tobacco: Never   Substance Use Topics    Alcohol use: Not  Currently    Drug use: Never

## 2025-07-03 ENCOUNTER — APPOINTMENT (OUTPATIENT)
Dept: PHYSICAL THERAPY | Facility: CLINIC | Age: 65
End: 2025-07-03
Payer: COMMERCIAL

## 2025-07-03 ENCOUNTER — OFFICE VISIT (OUTPATIENT)
Facility: CLINIC | Age: 65
End: 2025-07-03
Payer: COMMERCIAL

## 2025-07-03 VITALS
WEIGHT: 315 LBS | DIASTOLIC BLOOD PRESSURE: 78 MMHG | OXYGEN SATURATION: 92 % | SYSTOLIC BLOOD PRESSURE: 118 MMHG | HEART RATE: 68 BPM | TEMPERATURE: 97.1 F | BODY MASS INDEX: 44.1 KG/M2 | HEIGHT: 71 IN

## 2025-07-03 DIAGNOSIS — M54.50 ACUTE RIGHT-SIDED LOW BACK PAIN WITHOUT SCIATICA: Primary | ICD-10-CM

## 2025-07-03 DIAGNOSIS — R10.9 ACUTE RIGHT FLANK PAIN: ICD-10-CM

## 2025-07-03 LAB
POC APPEARANCE, URINE: CLEAR
POC BILIRUBIN, URINE: NEGATIVE
POC BLOOD, URINE: ABNORMAL
POC COLOR, URINE: YELLOW
POC GLUCOSE, URINE: NEGATIVE MG/DL
POC KETONES, URINE: NEGATIVE MG/DL
POC LEUKOCYTES, URINE: NEGATIVE
POC NITRITE,URINE: NEGATIVE
POC PH, URINE: 6 PH
POC PROTEIN, URINE: NEGATIVE MG/DL
POC SPECIFIC GRAVITY, URINE: 1.01
POC UROBILINOGEN, URINE: 0.2 EU/DL

## 2025-07-03 RX ORDER — KETOROLAC TROMETHAMINE 30 MG/ML
60 INJECTION, SOLUTION INTRAMUSCULAR; INTRAVENOUS ONCE
Status: SHIPPED | OUTPATIENT
Start: 2025-07-03 | End: 2025-07-08

## 2025-07-03 RX ORDER — CYCLOBENZAPRINE HCL 10 MG
TABLET ORAL
Qty: 30 TABLET | Refills: 0 | Status: SHIPPED | OUTPATIENT
Start: 2025-07-03

## 2025-07-03 RX ADMIN — KETOROLAC TROMETHAMINE 60 MG: 30 INJECTION, SOLUTION INTRAMUSCULAR; INTRAVENOUS at 03:00

## 2025-07-03 NOTE — PROGRESS NOTES
"  [facility]    [address]  [place] [address]    Phone: [phone]  Fax: [phone]    Progress Note        Subjective   Patient ID: Neto Yepez is a 64 y.o. male who presents for Back Pain (Middle back - huge knot that locks up and won't release . Hurts to walk , not able to push his mom in wheelchair, unable to lay flat , unable to do house chores /Started a week ago ).  HPI    Review of Systems    RX Allergies[1]  Current Medications[2]  Medical History[3]  Social History[4]      Objective   /78 (BP Location: Right arm, Patient Position: Sitting, BP Cuff Size: Large adult)   Pulse 68   Temp 36.2 °C (97.1 °F)   Ht 1.791 m (5' 10.5\")   Wt (!) 160 kg (353 lb 3.2 oz)   SpO2 92%   BMI 49.96 kg/m²   Physical Exam    Assessment/Plan         Total time spent with patient, reviewing records, and completing charting was 30 minutes, over half of it spent counseling and/or coordinating care  **Portions of this medical record have been created using voice recognition software and may have minor errors which are inherent in voice recognition systems. It has not been fully edited for typographical or grammatical errors**           [1] No Known Allergies  [2]   Current Outpatient Medications:     Dilantin Extended 100 mg capsule, IN THE AM ALTERNATE 2 OR 3 TABLETS, AND TAKE 2 TABLETS EVERY EVENING AS DIRECTED..... JS, Disp: 140 capsule, Rfl: 11    meloxicam (Mobic) 15 mg tablet, Take 1 tablet daily with food for right knee pain., Disp: 15 tablet, Rfl: 1    predniSONE (Deltasone) 10 mg tablet, Take 4 tabs daily for 2 days, then 3 tabs daily for 2 days, then 2 tabs daily for 2 days, then 1 tab daily for 2 days, then stop.  Take with food., Disp: 20 tablet, Rfl: 0  [3]   Past Medical History:  Diagnosis Date    Personal history of other medical treatment     History of bone density study    Unspecified convulsions (Multi) 03/21/2019    Seizure   [4]   Social " History  Tobacco Use    Smoking status: Every Day     Current packs/day: 0.00     Average packs/day: 0.3 packs/day for 39.8 years (10.0 ttl pk-yrs)     Types: Cigarettes     Start date:      Last attempt to quit: 2017     Years since quittin.5     Passive exposure: Past    Smokeless tobacco: Never   Substance Use Topics    Alcohol use: Not Currently    Drug use: Never      overly suspicious of a ureteral calculi etiology.      Total time spent with patient, reviewing records, and completing charting was approximately 36 minutes, over half of it spent counseling and/or coordinating care  **Portions of this medical record have been created using voice recognition software and may have minor errors which are inherent in voice recognition systems. It has not been fully edited for typographical or grammatical errors**

## 2025-07-07 ENCOUNTER — TELEPHONE (OUTPATIENT)
Facility: CLINIC | Age: 65
End: 2025-07-07
Payer: COMMERCIAL

## 2025-07-07 ENCOUNTER — TELEPHONE (OUTPATIENT)
Dept: PRIMARY CARE | Facility: CLINIC | Age: 65
End: 2025-07-07

## 2025-07-07 NOTE — TELEPHONE ENCOUNTER
Frankie from [facility] called to get an eight digit number for this facility [facility], it was [facility] physician account number stated he needed it so  a patient here could get test done, but upon more research patient is still under dulle did not switch over, did give Frankie the number for doctor najma

## 2025-07-08 LAB
APPEARANCE UR: CLEAR
BACTERIA #/AREA URNS HPF: ABNORMAL /HPF
BILIRUB UR QL STRIP: NEGATIVE
COLOR UR: YELLOW
GLUCOSE UR QL STRIP: NEGATIVE
HGB UR QL STRIP: NEGATIVE
HYALINE CASTS #/AREA URNS LPF: ABNORMAL /LPF
KETONES UR QL STRIP: NEGATIVE
LEUKOCYTE ESTERASE UR QL STRIP: NEGATIVE
NITRITE UR QL STRIP: NEGATIVE
PH UR STRIP: 6 [PH] (ref 5–8)
PROT UR QL STRIP: ABNORMAL
QUEST TRACKING HOUSE ACCOUNT: NORMAL
RBC #/AREA URNS HPF: ABNORMAL /HPF
SERVICE CMNT-IMP: ABNORMAL
SP GR UR STRIP: 1.02 (ref 1–1.03)
SQUAMOUS #/AREA URNS HPF: ABNORMAL /HPF
WBC #/AREA URNS HPF: ABNORMAL /HPF

## 2025-07-11 DIAGNOSIS — R10.9 ACUTE RIGHT FLANK PAIN: ICD-10-CM

## 2025-07-11 DIAGNOSIS — M54.50 ACUTE RIGHT-SIDED LOW BACK PAIN WITHOUT SCIATICA: ICD-10-CM

## 2025-07-11 RX ORDER — CYCLOBENZAPRINE HCL 10 MG
TABLET ORAL
Qty: 30 TABLET | Refills: 0 | Status: SHIPPED | OUTPATIENT
Start: 2025-07-11

## 2025-07-11 NOTE — PROGRESS NOTES
[facility]    [address]  [place] [address]    Phone: [phone]  Fax: [phone]    Progress Note        Subjective   Patient ID: Neto Yepez is a 64 y.o. male who presents for No chief complaint on file..  HPI    Review of Systems    RX Allergies[1]  Current Medications[2]  Medical History[3]  Social History[4]      Objective   There were no vitals taken for this visit.  Physical Exam    Assessment/Plan         Total time spent with patient, reviewing records, and completing charting was 30 minutes, over half of it spent counseling and/or coordinating care  **Portions of this medical record have been created using voice recognition software and may have minor errors which are inherent in voice recognition systems. It has not been fully edited for typographical or grammatical errors**           [1] No Known Allergies  [2]   Current Outpatient Medications:     cyclobenzaprine (Flexeril) 10 mg tablet, Take 1/2 to 1 tablet 3 times daily as needed for muscle spasm, Disp: 30 tablet, Rfl: 0    Dilantin Extended 100 mg capsule, IN THE AM ALTERNATE 2 OR 3 TABLETS, AND TAKE 2 TABLETS EVERY EVENING AS DIRECTED..... JS, Disp: 140 capsule, Rfl: 11  [3]   Past Medical History:  Diagnosis Date    Personal history of other medical treatment     History of bone density study    Unspecified convulsions (Multi) 2019    Seizure   [4]   Social History  Tobacco Use    Smoking status: Every Day     Current packs/day: 0.00     Average packs/day: 0.3 packs/day for 39.8 years (10.0 ttl pk-yrs)     Types: Cigarettes     Start date:      Last attempt to quit: 2017     Years since quittin.5     Passive exposure: Past    Smokeless tobacco: Never   Substance Use Topics    Alcohol use: Not Currently    Drug use: Never

## 2025-07-18 ENCOUNTER — LAB (OUTPATIENT)
Dept: LAB | Facility: HOSPITAL | Age: 65
End: 2025-07-18
Payer: COMMERCIAL

## 2025-07-18 DIAGNOSIS — Z00.00 ENCOUNTER FOR GENERAL ADULT MEDICAL EXAMINATION WITHOUT ABNORMAL FINDINGS: Primary | ICD-10-CM

## 2025-07-18 LAB
25(OH)D3 SERPL-MCNC: 27 NG/ML (ref 30–100)
ALBUMIN SERPL BCP-MCNC: 4.3 G/DL (ref 3.4–5)
ALP SERPL-CCNC: 91 U/L (ref 33–136)
ALT SERPL W P-5'-P-CCNC: 14 U/L (ref 10–52)
ANION GAP SERPL CALC-SCNC: 13 MMOL/L (ref 10–20)
AST SERPL W P-5'-P-CCNC: 11 U/L (ref 9–39)
BASOPHILS # BLD AUTO: 0.06 X10*3/UL (ref 0–0.1)
BASOPHILS NFR BLD AUTO: 1.1 %
BILIRUB SERPL-MCNC: 0.4 MG/DL (ref 0–1.2)
BUN SERPL-MCNC: 12 MG/DL (ref 6–23)
CALCIUM SERPL-MCNC: 9.2 MG/DL (ref 8.6–10.6)
CHLORIDE SERPL-SCNC: 102 MMOL/L (ref 98–107)
CHOLEST SERPL-MCNC: 165 MG/DL (ref 0–199)
CHOLESTEROL/HDL RATIO: 4
CO2 SERPL-SCNC: 27 MMOL/L (ref 21–32)
CREAT SERPL-MCNC: 0.75 MG/DL (ref 0.5–1.3)
CRP SERPL HS-MCNC: 8.6 MG/L
EGFRCR SERPLBLD CKD-EPI 2021: >90 ML/MIN/1.73M*2
EOSINOPHIL # BLD AUTO: 0.24 X10*3/UL (ref 0–0.7)
EOSINOPHIL NFR BLD AUTO: 4.4 %
ERYTHROCYTE [DISTWIDTH] IN BLOOD BY AUTOMATED COUNT: 13.3 % (ref 11.5–14.5)
EST. AVERAGE GLUCOSE BLD GHB EST-MCNC: 120 MG/DL
GLUCOSE SERPL-MCNC: 137 MG/DL (ref 74–99)
HBA1C MFR BLD: 5.8 % (ref ?–5.7)
HCT VFR BLD AUTO: 46.3 % (ref 41–52)
HDLC SERPL-MCNC: 41.6 MG/DL
HGB BLD-MCNC: 15.5 G/DL (ref 13.5–17.5)
IMM GRANULOCYTES # BLD AUTO: 0.02 X10*3/UL (ref 0–0.7)
IMM GRANULOCYTES NFR BLD AUTO: 0.4 % (ref 0–0.9)
LDLC SERPL CALC-MCNC: 99 MG/DL
LYMPHOCYTES # BLD AUTO: 1.38 X10*3/UL (ref 1.2–4.8)
LYMPHOCYTES NFR BLD AUTO: 25.5 %
MCH RBC QN AUTO: 31.9 PG (ref 26–34)
MCHC RBC AUTO-ENTMCNC: 33.5 G/DL (ref 32–36)
MCV RBC AUTO: 95 FL (ref 80–100)
MONOCYTES # BLD AUTO: 0.31 X10*3/UL (ref 0.1–1)
MONOCYTES NFR BLD AUTO: 5.7 %
NEUTROPHILS # BLD AUTO: 3.4 X10*3/UL (ref 1.2–7.7)
NEUTROPHILS NFR BLD AUTO: 62.9 %
NON HDL CHOLESTEROL: 123 MG/DL (ref 0–149)
NRBC BLD-RTO: 0 /100 WBCS (ref 0–0)
PLATELET # BLD AUTO: 210 X10*3/UL (ref 150–450)
POTASSIUM SERPL-SCNC: 4 MMOL/L (ref 3.5–5.3)
PROT SERPL-MCNC: 7.1 G/DL (ref 6.4–8.2)
PSA SERPL-MCNC: 0.35 NG/ML
RBC # BLD AUTO: 4.86 X10*6/UL (ref 4.5–5.9)
SODIUM SERPL-SCNC: 138 MMOL/L (ref 136–145)
TRIGL SERPL-MCNC: 121 MG/DL (ref 0–149)
TSH SERPL-ACNC: 1.22 MIU/L (ref 0.44–3.98)
VLDL: 24 MG/DL (ref 0–40)
WBC # BLD AUTO: 5.4 X10*3/UL (ref 4.4–11.3)

## 2025-07-18 PROCEDURE — 86141 C-REACTIVE PROTEIN HS: CPT

## 2025-07-18 PROCEDURE — 84443 ASSAY THYROID STIM HORMONE: CPT

## 2025-07-18 PROCEDURE — 80053 COMPREHEN METABOLIC PANEL: CPT

## 2025-07-18 PROCEDURE — 83036 HEMOGLOBIN GLYCOSYLATED A1C: CPT

## 2025-07-18 PROCEDURE — 85025 COMPLETE CBC W/AUTO DIFF WBC: CPT

## 2025-07-18 PROCEDURE — 80061 LIPID PANEL: CPT

## 2025-07-18 PROCEDURE — 82306 VITAMIN D 25 HYDROXY: CPT

## 2025-07-18 PROCEDURE — 84153 ASSAY OF PSA TOTAL: CPT

## 2025-07-18 ASSESSMENT — PROMIS GLOBAL HEALTH SCALE
RATE_QUALITY_OF_LIFE: EXCELLENT
RATE_SOCIAL_SATISFACTION: EXCELLENT
CARRYOUT_SOCIAL_ACTIVITIES: EXCELLENT
EMOTIONAL_PROBLEMS: NEVER
RATE_AVERAGE_PAIN: 4
CARRYOUT_PHYSICAL_ACTIVITIES: COMPLETELY
RATE_MENTAL_HEALTH: EXCELLENT
RATE_PHYSICAL_HEALTH: VERY GOOD
RATE_GENERAL_HEALTH: VERY GOOD

## 2025-07-19 LAB — PHENYTOIN SERPL-MCNC: 16 MG/L (ref 10–20)

## 2025-07-20 LAB — HOLD SPECIMEN: NORMAL

## 2025-07-24 NOTE — PROGRESS NOTES
[facility]    [address]  [place] [address]    Phone: [phone]  Fax: [phone]    Progress Note        Subjective   Patient ID: Neto Yepez is a 64 y.o. male who presents for No chief complaint on file..  HPI  Neto was seen today for an annual [facility] physical exam.  He is overall felt well, though has had knee and low back pain over the last few months.  He no longer takes Flexeril, noted constipation and dry mouth from it, though it did help some.    He continues to use CPAP nightly, notes more restorative sleep, less daytime drowsiness, better overall wellbeing.    Vaccines:  Influenza--due this fall  Tdap--7-2023  Shingles--completed 2020  Covid--most recent 9-2024  Pneumococcal--eligible for Prevnar-20  RSV--optional    Tobacco--N/A    Cancer screens:  Prostate cancer--normal PSA 7-2025, 0.35  Colon cancer--colonoscopy 1-2023, due 2033    Neto continues JS Dilantin, level just checked, normal  Vitamin D level is low at 27 (ideally >40), which is very common without supplementation.  Fasting glucose is elevated at 137, A1c 5.8.  His diet does have a good bit of room for improvement, he specifically mentions snacks and his dinner meals as things to work on.  C-reactive protein was elevated at 8.6.    Review of Systems  The full review of systems is negative with the exception of what is noted in HPI    No Known Allergies    Current Outpatient Medications:     cyclobenzaprine (Flexeril) 10 mg tablet, Take 1/2 to 1 tablet 3 times daily as needed for muscle spasm, Disp: 30 tablet, Rfl: 0    Dilantin Extended 100 mg capsule, IN THE AM ALTERNATE 2 OR 3 TABLETS, AND TAKE 2 TABLETS EVERY EVENING AS DIRECTED..... JS, Disp: 140 capsule, Rfl: 11  Past Medical History:   Diagnosis Date    Personal history of other medical treatment     History of bone density study    Unspecified convulsions (Multi) 03/21/2019    Seizure     Social History     Tobacco Use     Smoking status: Every Day     Current packs/day: 0.00     Average packs/day: 0.3 packs/day for 39.8 years (10.0 ttl pk-yrs)     Types: Cigarettes     Start date:      Last attempt to quit: 2017     Years since quittin.5     Passive exposure: Past    Smokeless tobacco: Never   Substance Use Topics    Alcohol use: Not Currently    Drug use: Never     Lab on 2025   Component Date Value Ref Range Status    Extra Tube 2025 Hold for add-ons.   Final    Auto resulted.   Orders Only on 2025   Component Date Value Ref Range Status    [facility] TRACK HOUSE ACCOUNT 2025    Final    Comment: We were unable to identify an account number for the  order submitted. If you do not have a [employer]   account number or if your account information needs   to be updated please call [phone] ([phone])  for assistance.     To prevent delays in testing and processing of your  orders please provide the following information for  this order and with every additional order submitted:  [facility] account number and account name   Client address  Client phone and fax number  NPI number of ordering physician along with the  physician name.      COLOR 2025 YELLOW  YELLOW Final    APPEARANCE 2025 CLEAR  CLEAR Final    SPECIFIC GRAVITY 2025 1.022  1.001 - 1.035 Final    PH 2025 6.0  5.0 - 8.0 Final    GLUCOSE 2025 NEGATIVE  NEGATIVE Final    BILIRUBIN 2025 NEGATIVE  NEGATIVE Final    KETONES 2025 NEGATIVE  NEGATIVE Final    OCCULT BLOOD 2025 NEGATIVE  NEGATIVE Final    PROTEIN 2025 TRACE (A)  NEGATIVE Final    NITRITE 2025 NEGATIVE  NEGATIVE Final    LEUKOCYTE ESTERASE 2025 NEGATIVE  NEGATIVE Final    WBC 2025 NONE SEEN  < OR = 5 /HPF Final    RBC 2025 NONE SEEN  < OR = 2 /HPF Final    SQUAMOUS EPITHELIAL CELLS 2025 NONE SEEN  < OR = 5 /HPF Final    BACTERIA 2025 NONE SEEN  NONE SEEN /HPF Final    HYALINE CAST  07/03/2025 NONE SEEN  NONE SEEN /LPF Final    NOTE 07/03/2025    Final    Comment: This urine was analyzed for the presence of WBC,   RBC, bacteria, casts, and other formed elements.   Only those elements seen were reported.           Office Visit on 07/03/2025   Component Date Value Ref Range Status    POC Color, Urine 07/03/2025 Yellow  Straw, Yellow, Light-Yellow Final    POC Appearance, Urine 07/03/2025 Clear  Clear Final    POC Glucose, Urine 07/03/2025 NEGATIVE  NEGATIVE mg/dl Final    POC Bilirubin, Urine 07/03/2025 NEGATIVE  NEGATIVE Final    POC Ketones, Urine 07/03/2025 NEGATIVE  NEGATIVE mg/dl Final    POC Specific Gravity, Urine 07/03/2025 1.015  1.005 - 1.035 Final    POC Blood, Urine 07/03/2025 TRACE-Intact (A)  NEGATIVE Final    POC PH, Urine 07/03/2025 6.0  No Reference Range Established PH Final    POC Protein, Urine 07/03/2025 NEGATIVE  NEGATIVE mg/dl Final    POC Urobilinogen, Urine 07/03/2025 0.2  0.2, 1.0 EU/DL Final    Poc Nitrite, Urine 07/03/2025 NEGATIVE  NEGATIVE Final    POC Leukocytes, Urine 07/03/2025 NEGATIVE  NEGATIVE Final   Office Visit on 06/09/2025   Component Date Value Ref Range Status    PHENYTOIN 07/18/2025 16.0  10.0 - 20.0 mg/L  Final   Orders Only on 02/07/2025   Component Date Value Ref Range Status    WBC 07/18/2025 5.4  4.4 - 11.3 x10*3/uL Final    nRBC 07/18/2025 0.0  0.0 - 0.0 /100 WBCs Final    RBC 07/18/2025 4.86  4.50 - 5.90 x10*6/uL Final    Hemoglobin 07/18/2025 15.5  13.5 - 17.5 g/dL Final    Hematocrit 07/18/2025 46.3  41.0 - 52.0 % Final    MCV 07/18/2025 95  80 - 100 fL Final    MCH 07/18/2025 31.9  26.0 - 34.0 pg Final    MCHC 07/18/2025 33.5  32.0 - 36.0 g/dL Final    RDW 07/18/2025 13.3  11.5 - 14.5 % Final    Platelets 07/18/2025 210  150 - 450 x10*3/uL Final    Neutrophils % 07/18/2025 62.9  40.0 - 80.0 % Final    Immature Granulocytes %, Automated 07/18/2025 0.4  0.0 - 0.9 % Final    Immature Granulocyte Count (IG) includes promyelocytes, myelocytes and  metamyelocytes but does not include bands. Percent differential counts (%) should be interpreted in the context of the absolute cell counts (cells/UL).    Lymphocytes % 07/18/2025 25.5  13.0 - 44.0 % Final    Monocytes % 07/18/2025 5.7  2.0 - 10.0 % Final    Eosinophils % 07/18/2025 4.4  0.0 - 6.0 % Final    Basophils % 07/18/2025 1.1  0.0 - 2.0 % Final    Neutrophils Absolute 07/18/2025 3.40  1.20 - 7.70 x10*3/uL Final    Percent differential counts (%) should be interpreted in the context of the absolute cell counts (cells/uL).    Immature Granulocytes Absolute, Au* 07/18/2025 0.02  0.00 - 0.70 x10*3/uL Final    Lymphocytes Absolute 07/18/2025 1.38  1.20 - 4.80 x10*3/uL Final    Monocytes Absolute 07/18/2025 0.31  0.10 - 1.00 x10*3/uL Final    Eosinophils Absolute 07/18/2025 0.24  0.00 - 0.70 x10*3/uL Final    Basophils Absolute 07/18/2025 0.06  0.00 - 0.10 x10*3/uL Final    Glucose 07/18/2025 137 (H)  74 - 99 mg/dL Final    Sodium 07/18/2025 138  136 - 145 mmol/L Final    Potassium 07/18/2025 4.0  3.5 - 5.3 mmol/L Final    Chloride 07/18/2025 102  98 - 107 mmol/L Final    Bicarbonate 07/18/2025 27  21 - 32 mmol/L Final    Anion Gap 07/18/2025 13  10 - 20 mmol/L Final    Urea Nitrogen 07/18/2025 12  6 - 23 mg/dL Final    Creatinine 07/18/2025 0.75  0.50 - 1.30 mg/dL Final    eGFR 07/18/2025 >90  >60 mL/min/1.73m*2 Final    Calculations of estimated GFR are performed using the 2021 CKD-EPI Study Refit equation without the race variable for the IDMS-Traceable creatinine methods.  https://jasn.asnjournals.org/content/early/2021/09/22/ASN.7218521694    Calcium 07/18/2025 9.2  8.6 - 10.6 mg/dL Final    Albumin 07/18/2025 4.3  3.4 - 5.0 g/dL Final    Alkaline Phosphatase 07/18/2025 91  33 - 136 U/L Final    Total Protein 07/18/2025 7.1  6.4 - 8.2 g/dL Final    AST 07/18/2025 11  9 - 39 U/L Final    Bilirubin, Total 07/18/2025 0.4  0.0 - 1.2 mg/dL Final    ALT 07/18/2025 14  10 - 52 U/L Final    Patients treated with  Sulfasalazine may generate falsely decreased results for ALT.    CRP, High Sensitivity 07/18/2025 8.6 (H)  <1.0 mg/L Final    Hemoglobin A1C 07/18/2025 5.8 (H)  See comment % Final    Estimated Average Glucose 07/18/2025 120  Not Established mg/dL Final    Cholesterol 07/18/2025 165  0 - 199 mg/dL Final          Age      Desirable   Borderline High   High     0-19 Y     0 - 169       170 - 199     >/= 200    20-24 Y     0 - 189       190 - 224     >/= 225         >24 Y     0 - 199       200 - 239     >/= 240   **All ranges are based on fasting samples. Specific   therapeutic targets will vary based on patient-specific   cardiac risk.    Pediatric guidelines reference:Pediatrics 2011, 128(S5).Adult guidelines reference: NCEP ATPIII Guidelines,MIK 2001, 258:2486-97    Venipuncture immediately after or during the administration of Metamizole may lead to falsely low results. Testing should be performed immediately prior to Metamizole dosing.    HDL-Cholesterol 07/18/2025 41.6  mg/dL Final      Age       Very Low   Low     Normal    High    0-19 Y    < 35      < 40     40-45     ----  20-24 Y    ----     < 40      >45      ----        >24 Y      ----     < 40     40-60      >60      Cholesterol/HDL Ratio 07/18/2025 4.0   Final      Ref Values  Desirable  < 3.4  High Risk  > 5.0    LDL Calculated 07/18/2025 99  <=99 mg/dL Final                                Near   Borderline      AGE      Desirable  Optimal    High     High     Very High     0-19 Y     0 - 109     ---    110-129   >/= 130     ----    20-24 Y     0 - 119     ---    120-159   >/= 160     ----      >24 Y     0 -  99   100-129  130-159   160-189     >/=190    LDL Cholesterol is calculated using the Friedewald equation.    VLDL 07/18/2025 24  0 - 40 mg/dL Final    Triglycerides 07/18/2025 121  0 - 149 mg/dL Final    Age              Desirable        Borderline         High        Very High  SEX:B           mg/dL             mg/dL               mg/dL       mg/dL  <=14D                       ----               ----        ----  15D-365D                    ----               ----        ----  1Y-9Y           0-74               75-99             >=100       ----  10Y-19Y        0-89                            >=130       ----  20Y-24Y        0-114             115-149             >=150      ----  >= 25Y         0-149             150-199             200-499    >=500      Venipuncture immediately after or during the administration of Metamizole may lead to falsely low results. Testing should be performed immediately prior to Metamizole dosing.    Non HDL Cholesterol 07/18/2025 123  0 - 149 mg/dL Final          Age       Desirable   Borderline High   High     Very High     0-19 Y     0 - 119       120 - 144     >/= 145    >/= 160    20-24 Y     0 - 149       150 - 189     >/= 190      ----         >24 Y    30 mg/dL above LDL Cholesterol goal      Prostate Specific Antigen,Screen 07/18/2025 0.35  <=4.00 ng/mL Final    Thyroid Stimulating Hormone 07/18/2025 1.22  0.44 - 3.98 mIU/L Final    Vitamin D, 25-Hydroxy, Total 07/18/2025 27 (L)  30 - 100 ng/mL Final         Objective   There were no vitals taken for this visit.  Physical Exam  Constitutional/General appearance: alert, oriented, well-appearing, in no distress  Head and face exam is normal  No scleral icterus or conjunctival erythema present  Hearing is grossly normal  Respiratory effort is normal, no dyspnea noted  Cortical function is normal  Mood, affect, are pleasant, appropriate, and interactive.  Insight is normal  Cardiac exam reveals a regular rate and rhythm, no murmurs, rubs or gallops present.   Lungs are clear bilaterally.    No lower extremity edema present.    Assessment/Plan   Today your wellness care was reviewed.  Please keep up your vision and dental care.  Focus on lifestyle efforts, including a goal of 30 minutes of exercise 5 days/week.    A healthy diet rich in fruits, vegetables,  and lean proteins encouraged.  Healthy fats, such as can be found in nuts, olives, avocados are encouraged (unless allergies prohibit).  Avoid/minimize junk and fast food    Labs reviewed in detail, need to focus mostly on the elevated sugar.  Neto will focus on dietary efforts, recheck labs in 3 to 4 months, follow-up shortly thereafter.  See HPI for vaccine and cancer screening update  Vitamin D3 2000 units daily for low level  Continue brand-name Dilantin, level normal at 16.  Discussed the option of seeing neurology at some point, to review medication options since his brand-name Dilantin remains expensive.  We will hold off for now, he has been stable for decades.    Continue CPAP, no need to update a sleep study at present  Coronary artery CT scan ordered    Reviewed the [facility] annual membership benefit options (pick one):  Nutrition consultation  Exercise/ consultation  [facility] in [place], services include acupuncture, massage, infrared sauna and salt therapy as well as [employer] treatments.  Packages are available    Follow-up in 3 to 4 months, shortly after fasting labs checked, order placed, along with a C-reactive protein and vitamin D.      Total time spent with patient, reviewing records, and completing charting was 47 minutes, over half of it spent counseling and/or coordinating care  **Portions of this medical record have been created using voice recognition software and may have minor errors which are inherent in voice recognition systems. It has not been fully edited for typographical or grammatical errors**

## 2025-07-25 ENCOUNTER — OFFICE VISIT (OUTPATIENT)
Facility: CLINIC | Age: 65
End: 2025-07-25
Payer: COMMERCIAL

## 2025-07-25 ENCOUNTER — APPOINTMENT (OUTPATIENT)
Facility: CLINIC | Age: 65
End: 2025-07-25
Payer: COMMERCIAL

## 2025-07-25 ENCOUNTER — APPOINTMENT (OUTPATIENT)
Dept: PRIMARY CARE | Facility: CLINIC | Age: 65
End: 2025-07-25
Payer: COMMERCIAL

## 2025-07-25 VITALS
DIASTOLIC BLOOD PRESSURE: 82 MMHG | OXYGEN SATURATION: 93 % | TEMPERATURE: 98.2 F | HEIGHT: 70 IN | WEIGHT: 315 LBS | SYSTOLIC BLOOD PRESSURE: 153 MMHG | BODY MASS INDEX: 45.1 KG/M2 | HEART RATE: 71 BPM

## 2025-07-25 DIAGNOSIS — R73.9 ELEVATED BLOOD SUGAR: ICD-10-CM

## 2025-07-25 DIAGNOSIS — R79.82 ELEVATED HIGH SENSITIVITY C-REACTIVE PROTEIN: ICD-10-CM

## 2025-07-25 DIAGNOSIS — Z00.00 ANNUAL PHYSICAL EXAM: Primary | ICD-10-CM

## 2025-07-25 DIAGNOSIS — Z23 IMMUNIZATION DUE: ICD-10-CM

## 2025-07-25 DIAGNOSIS — R79.89 LOW VITAMIN D LEVEL: ICD-10-CM

## 2025-07-30 DIAGNOSIS — R56.9 SEIZURE (MULTI): ICD-10-CM

## 2025-07-30 RX ORDER — PHENYTOIN SODIUM 100 MG/1
CAPSULE, EXTENDED RELEASE ORAL
Qty: 140 CAPSULE | Refills: 0 | Status: SHIPPED | OUTPATIENT
Start: 2025-07-30

## 2025-08-21 ENCOUNTER — EVALUATION (OUTPATIENT)
Dept: PHYSICAL THERAPY | Facility: CLINIC | Age: 65
End: 2025-08-21
Payer: COMMERCIAL

## 2025-08-21 DIAGNOSIS — M25.561 CHRONIC PAIN OF RIGHT KNEE: ICD-10-CM

## 2025-08-21 DIAGNOSIS — G89.29 CHRONIC PAIN OF RIGHT KNEE: ICD-10-CM

## 2025-08-21 DIAGNOSIS — R29.898 RIGHT LEG WEAKNESS: ICD-10-CM

## 2025-08-21 DIAGNOSIS — M25.661 KNEE STIFFNESS, RIGHT: Primary | ICD-10-CM

## 2025-08-21 PROCEDURE — 97110 THERAPEUTIC EXERCISES: CPT | Mod: GP | Performed by: PHYSICAL THERAPIST

## 2025-08-21 PROCEDURE — 97161 PT EVAL LOW COMPLEX 20 MIN: CPT | Mod: GP | Performed by: PHYSICAL THERAPIST

## 2025-08-26 ENCOUNTER — TREATMENT (OUTPATIENT)
Dept: PHYSICAL THERAPY | Facility: CLINIC | Age: 65
End: 2025-08-26
Payer: COMMERCIAL

## 2025-08-26 DIAGNOSIS — R29.898 RIGHT LEG WEAKNESS: ICD-10-CM

## 2025-08-26 DIAGNOSIS — M25.661 KNEE STIFFNESS, RIGHT: Primary | ICD-10-CM

## 2025-08-26 DIAGNOSIS — G89.29 CHRONIC PAIN OF RIGHT KNEE: ICD-10-CM

## 2025-08-26 DIAGNOSIS — M25.561 CHRONIC PAIN OF RIGHT KNEE: ICD-10-CM

## 2025-08-26 PROCEDURE — 97110 THERAPEUTIC EXERCISES: CPT | Mod: GP | Performed by: PHYSICAL THERAPIST

## 2025-08-27 DIAGNOSIS — R56.9 SEIZURE (MULTI): ICD-10-CM

## 2025-08-27 RX ORDER — PHENYTOIN SODIUM 100 MG/1
CAPSULE, EXTENDED RELEASE ORAL
Qty: 420 CAPSULE | Refills: 3 | Status: SHIPPED | OUTPATIENT
Start: 2025-08-27

## 2025-08-28 ENCOUNTER — TREATMENT (OUTPATIENT)
Dept: PHYSICAL THERAPY | Facility: CLINIC | Age: 65
End: 2025-08-28
Payer: COMMERCIAL

## 2025-08-28 DIAGNOSIS — M25.561 CHRONIC PAIN OF RIGHT KNEE: ICD-10-CM

## 2025-08-28 DIAGNOSIS — R29.898 RIGHT LEG WEAKNESS: ICD-10-CM

## 2025-08-28 DIAGNOSIS — M25.661 KNEE STIFFNESS, RIGHT: Primary | ICD-10-CM

## 2025-08-28 DIAGNOSIS — G89.29 CHRONIC PAIN OF RIGHT KNEE: ICD-10-CM

## 2025-08-28 PROCEDURE — 97110 THERAPEUTIC EXERCISES: CPT | Mod: GP | Performed by: PHYSICAL THERAPIST

## 2025-09-04 ENCOUNTER — TREATMENT (OUTPATIENT)
Dept: PHYSICAL THERAPY | Facility: CLINIC | Age: 65
End: 2025-09-04
Payer: COMMERCIAL

## 2025-09-04 DIAGNOSIS — M25.661 KNEE STIFFNESS, RIGHT: Primary | ICD-10-CM

## 2025-09-04 DIAGNOSIS — G89.29 CHRONIC PAIN OF RIGHT KNEE: ICD-10-CM

## 2025-09-04 DIAGNOSIS — R29.898 RIGHT LEG WEAKNESS: ICD-10-CM

## 2025-09-04 DIAGNOSIS — M25.561 CHRONIC PAIN OF RIGHT KNEE: ICD-10-CM

## 2025-09-04 PROCEDURE — 97110 THERAPEUTIC EXERCISES: CPT | Mod: GP | Performed by: PHYSICAL THERAPIST

## 2025-11-17 ENCOUNTER — APPOINTMENT (OUTPATIENT)
Facility: CLINIC | Age: 65
End: 2025-11-17
Payer: COMMERCIAL